# Patient Record
Sex: MALE | Race: WHITE | NOT HISPANIC OR LATINO | URBAN - METROPOLITAN AREA
[De-identification: names, ages, dates, MRNs, and addresses within clinical notes are randomized per-mention and may not be internally consistent; named-entity substitution may affect disease eponyms.]

---

## 2020-07-06 ENCOUNTER — OFFICE VISIT (OUTPATIENT)
Dept: URBAN - METROPOLITAN AREA CLINIC 40 | Facility: CLINIC | Age: 75
End: 2020-07-06
Payer: COMMERCIAL

## 2020-07-06 ENCOUNTER — LAB OUTSIDE AN ENCOUNTER (OUTPATIENT)
Dept: URBAN - METROPOLITAN AREA CLINIC 40 | Facility: CLINIC | Age: 75
End: 2020-07-06

## 2020-07-06 DIAGNOSIS — K59.09 OTHER CONSTIPATION: ICD-10-CM

## 2020-07-06 DIAGNOSIS — K64.1 GRADE II HEMORRHOIDS: ICD-10-CM

## 2020-07-06 DIAGNOSIS — R19.8 CHANGE IN BOWEL FUNCTION: ICD-10-CM

## 2020-07-06 DIAGNOSIS — Z86.010 HX OF COLONIC POLYP: ICD-10-CM

## 2020-07-06 DIAGNOSIS — Z80.0 FAMILY HX OF COLON CANCER: ICD-10-CM

## 2020-07-06 PROCEDURE — 99243 OFF/OP CNSLTJ NEW/EST LOW 30: CPT | Performed by: INTERNAL MEDICINE

## 2020-07-06 PROCEDURE — 99213 OFFICE O/P EST LOW 20 MIN: CPT | Performed by: INTERNAL MEDICINE

## 2020-07-06 RX ORDER — SODIUM, POTASSIUM,MAG SULFATES 17.5-3.13G
177ML AS DIRECTED SOLUTION, RECONSTITUTED, ORAL ORAL ONCE
Qty: 1 | Refills: 0 | OUTPATIENT
Start: 2020-07-06 | End: 2020-07-07

## 2020-07-06 NOTE — HPI-TODAY'S VISIT:
Patient presents for evaluation management of recurrent hemorrhoids.  Recall he has a history of hemorrhoids and banded x3 in 2013 and 2014 by myself.  Initial response.  Now with recurrent burning itching occasional prolapse and white bleeding.  Bowels are otherwise regular.  No red flag symptoms noted additionally.  He had a colonoscopy 2012 with removal of 2 small adenomatous polyps.  He also has a family history of colon cancer in first-degree relative.  He is overdue for surveillance colonoscopy. He c/o change in  bowels with acute constipation. No BM in 10-14 days about 2 weeks ago. He drank Mag Citrate otc over the weekend x3 and had large BM. Feels better now. No straining. Recent annual checkup with pcp, labs normal. No cardiac issues.

## 2020-07-10 ENCOUNTER — OFFICE VISIT (OUTPATIENT)
Dept: URBAN - METROPOLITAN AREA CLINIC 40 | Facility: CLINIC | Age: 75
End: 2020-07-10

## 2020-07-17 ENCOUNTER — OFFICE VISIT (OUTPATIENT)
Dept: URBAN - METROPOLITAN AREA SURGERY CENTER 30 | Facility: SURGERY CENTER | Age: 75
End: 2020-07-17
Payer: COMMERCIAL

## 2020-07-17 DIAGNOSIS — K64.8 BLEEDING INTERNAL HEMORRHOIDS: ICD-10-CM

## 2020-07-17 DIAGNOSIS — Z86.010 H/O ADENOMATOUS POLYP OF COLON: ICD-10-CM

## 2020-07-17 PROCEDURE — G9936 PMH PLYP/NEO CO/RECT/JUN/ANS: HCPCS | Performed by: INTERNAL MEDICINE

## 2020-07-17 PROCEDURE — 46221 LIGATION OF HEMORRHOID(S): CPT | Performed by: INTERNAL MEDICINE

## 2020-07-17 PROCEDURE — G8907 PT DOC NO EVENTS ON DISCHARG: HCPCS | Performed by: INTERNAL MEDICINE

## 2020-07-17 PROCEDURE — G0105 COLORECTAL SCRN; HI RISK IND: HCPCS | Performed by: INTERNAL MEDICINE

## 2020-08-07 ENCOUNTER — OFFICE VISIT (OUTPATIENT)
Dept: URBAN - METROPOLITAN AREA CLINIC 40 | Facility: CLINIC | Age: 75
End: 2020-08-07

## 2020-08-10 ENCOUNTER — TELEPHONE ENCOUNTER (OUTPATIENT)
Dept: URBAN - METROPOLITAN AREA CLINIC 92 | Facility: CLINIC | Age: 75
End: 2020-08-10

## 2020-08-10 RX ORDER — LINACLOTIDE 145 UG/1
1 CAPSULE AT LEAST 30 MINUTES BEFORE THE FIRST MEAL OF THE DAY ON AN EMPTY STOMACH CAPSULE, GELATIN COATED ORAL ONCE A DAY
Qty: 90 | Refills: 3 | OUTPATIENT
Start: 2020-08-10 | End: 2021-08-05

## 2020-08-20 ENCOUNTER — OFFICE VISIT (OUTPATIENT)
Dept: URBAN - METROPOLITAN AREA CLINIC 40 | Facility: CLINIC | Age: 75
End: 2020-08-20
Payer: COMMERCIAL

## 2020-08-20 DIAGNOSIS — K64.8 BLEEDING INTERNAL HEMORRHOIDS: ICD-10-CM

## 2020-08-20 DIAGNOSIS — K64.1 GRADE II HEMORRHOIDS: ICD-10-CM

## 2020-08-20 PROCEDURE — 99212 OFFICE O/P EST SF 10 MIN: CPT | Performed by: INTERNAL MEDICINE

## 2020-08-20 PROCEDURE — 46221 LIGATION OF HEMORRHOID(S): CPT | Performed by: INTERNAL MEDICINE

## 2020-08-20 RX ORDER — LINACLOTIDE 145 UG/1
1 CAPSULE AT LEAST 30 MINUTES BEFORE THE FIRST MEAL OF THE DAY ON AN EMPTY STOMACH CAPSULE, GELATIN COATED ORAL ONCE A DAY
Qty: 90 | Refills: 3 | Status: ACTIVE | COMMUNITY
Start: 2020-08-10 | End: 2021-08-05

## 2020-08-20 NOTE — HPI-TODAY'S VISIT:
Patient returns to clinic after colonoscopy.  Recent procedure confirmed internal hemorrhoids.  The left lateral hemorrhoid plexus was banded.  Otherwise diverticulosis but no inflammation or neoplasia.  History of colon polyps noted and next colonoscopy recommended in 5 years time.  He did well after his first banding and is here for his second procedure.

## 2020-09-10 ENCOUNTER — OFFICE VISIT (OUTPATIENT)
Dept: URBAN - METROPOLITAN AREA CLINIC 40 | Facility: CLINIC | Age: 75
End: 2020-09-10
Payer: COMMERCIAL

## 2020-09-10 ENCOUNTER — OFFICE VISIT (OUTPATIENT)
Dept: URBAN - METROPOLITAN AREA CLINIC 40 | Facility: CLINIC | Age: 75
End: 2020-09-10

## 2020-09-10 DIAGNOSIS — K64.1 GRADE II HEMORRHOIDS: ICD-10-CM

## 2020-09-10 PROCEDURE — 46221 LIGATION OF HEMORRHOID(S): CPT | Performed by: INTERNAL MEDICINE

## 2020-09-10 RX ORDER — LINACLOTIDE 145 UG/1
1 CAPSULE AT LEAST 30 MINUTES BEFORE THE FIRST MEAL OF THE DAY ON AN EMPTY STOMACH CAPSULE, GELATIN COATED ORAL ONCE A DAY
Qty: 90 | Refills: 3 | Status: ON HOLD | COMMUNITY
Start: 2020-08-10 | End: 2021-08-05

## 2020-12-08 ENCOUNTER — OFFICE VISIT (OUTPATIENT)
Dept: URBAN - METROPOLITAN AREA CLINIC 40 | Facility: CLINIC | Age: 75
End: 2020-12-08

## 2022-01-13 ENCOUNTER — OFFICE VISIT (OUTPATIENT)
Dept: URBAN - METROPOLITAN AREA CLINIC 40 | Facility: CLINIC | Age: 77
End: 2022-01-13

## 2022-07-07 ENCOUNTER — OFFICE VISIT (OUTPATIENT)
Dept: URBAN - METROPOLITAN AREA CLINIC 40 | Facility: CLINIC | Age: 77
End: 2022-07-07

## 2022-08-05 ENCOUNTER — OFFICE VISIT (OUTPATIENT)
Dept: URBAN - METROPOLITAN AREA CLINIC 40 | Facility: CLINIC | Age: 77
End: 2022-08-05

## 2022-09-23 ENCOUNTER — DASHBOARD ENCOUNTERS (OUTPATIENT)
Age: 77
End: 2022-09-23

## 2022-09-26 ENCOUNTER — OFFICE VISIT (OUTPATIENT)
Dept: URBAN - METROPOLITAN AREA CLINIC 40 | Facility: CLINIC | Age: 77
End: 2022-09-26
Payer: COMMERCIAL

## 2022-09-26 VITALS
HEIGHT: 69 IN | BODY MASS INDEX: 28.23 KG/M2 | WEIGHT: 190.6 LBS | TEMPERATURE: 98.1 F | HEART RATE: 59 BPM | SYSTOLIC BLOOD PRESSURE: 146 MMHG | OXYGEN SATURATION: 94 % | DIASTOLIC BLOOD PRESSURE: 70 MMHG

## 2022-09-26 DIAGNOSIS — K64.9 HEMORRHOIDS WITHOUT COMPLICATION: ICD-10-CM

## 2022-09-26 DIAGNOSIS — K64.1 GRADE II HEMORRHOIDS: ICD-10-CM

## 2022-09-26 PROCEDURE — 46221 LIGATION OF HEMORRHOID(S): CPT | Performed by: INTERNAL MEDICINE

## 2022-09-26 NOTE — HPI-TODAY'S VISIT:
Colonoscopy 2020 negative, 5 years, hx polyps. Hx hemorrhoids, band x3 2020 with response. Recurrent hemorrhoids with p/b/i.

## 2025-08-01 ENCOUNTER — TELEPHONE ENCOUNTER (OUTPATIENT)
Dept: URBAN - METROPOLITAN AREA CLINIC 74 | Facility: CLINIC | Age: 80
End: 2025-08-01